# Patient Record
Sex: MALE | Race: WHITE | NOT HISPANIC OR LATINO | URBAN - METROPOLITAN AREA
[De-identification: names, ages, dates, MRNs, and addresses within clinical notes are randomized per-mention and may not be internally consistent; named-entity substitution may affect disease eponyms.]

---

## 2020-01-08 PROBLEM — I63.9 CVA (CEREBRAL VASCULAR ACCIDENT): Status: ACTIVE | Noted: 2020-01-08

## 2020-01-08 PROBLEM — I10 HTN (HYPERTENSION): Status: ACTIVE | Noted: 2020-01-08

## 2020-01-08 PROBLEM — I25.10 CAD (CORONARY ARTERY DISEASE): Status: ACTIVE | Noted: 2020-01-08

## 2020-01-08 PROBLEM — E11.9 DIABETES: Status: ACTIVE | Noted: 2020-01-08

## 2020-01-12 PROBLEM — G93.6 CEREBRAL EDEMA: Status: ACTIVE | Noted: 2020-01-12

## 2020-01-12 PROBLEM — I50.9 CHF (CONGESTIVE HEART FAILURE): Status: ACTIVE | Noted: 2020-01-12

## 2020-01-12 PROBLEM — R47.01 APHASIA: Status: ACTIVE | Noted: 2020-01-12

## 2020-01-12 PROBLEM — I63.312 CEREBROVASCULAR ACCIDENT (CVA) DUE TO THROMBOSIS OF LEFT MIDDLE CEREBRAL ARTERY: Status: ACTIVE | Noted: 2020-01-08

## 2020-01-22 PROBLEM — I50.22 CHRONIC SYSTOLIC CONGESTIVE HEART FAILURE: Status: ACTIVE | Noted: 2020-01-12

## 2020-01-31 PROBLEM — F10.10 ALCOHOL ABUSE: Status: ACTIVE | Noted: 2020-01-31

## 2020-01-31 PROBLEM — F17.201 TOBACCO ABUSE, IN REMISSION: Status: ACTIVE | Noted: 2020-01-31

## 2020-01-31 PROBLEM — F14.11 COCAINE ABUSE IN REMISSION: Status: ACTIVE | Noted: 2020-01-31

## 2020-04-28 PROBLEM — R07.9 CHEST PAIN AT REST: Status: ACTIVE | Noted: 2020-04-28

## 2020-04-28 PROBLEM — R07.9 ACUTE CHEST PAIN: Status: ACTIVE | Noted: 2020-04-28

## 2020-04-29 PROBLEM — R07.9 ACUTE CHEST PAIN: Status: RESOLVED | Noted: 2020-04-28 | Resolved: 2020-04-29

## 2020-04-29 PROBLEM — E11.9 DIABETES: Status: RESOLVED | Noted: 2020-01-08 | Resolved: 2020-04-29

## 2020-04-29 PROBLEM — E87.1 HYPONATREMIA: Status: ACTIVE | Noted: 2020-04-29

## 2020-04-29 PROBLEM — R07.9 CHEST PAIN AT REST: Status: RESOLVED | Noted: 2020-04-28 | Resolved: 2020-04-29

## 2020-06-15 PROBLEM — Z79.899 HIGH RISK MEDICATION USE: Status: ACTIVE | Noted: 2020-06-15

## 2020-06-15 PROBLEM — F10.11 ALCOHOL ABUSE, IN REMISSION: Status: ACTIVE | Noted: 2020-01-31

## 2022-07-09 ENCOUNTER — TELEPHONE ENCOUNTER (OUTPATIENT)
Dept: URBAN - METROPOLITAN AREA CLINIC 121 | Facility: CLINIC | Age: 62
End: 2022-07-09

## 2022-07-10 ENCOUNTER — TELEPHONE ENCOUNTER (OUTPATIENT)
Dept: URBAN - METROPOLITAN AREA CLINIC 121 | Facility: CLINIC | Age: 62
End: 2022-07-10

## 2022-09-27 PROBLEM — E11.65 CONTROLLED TYPE 2 DIABETES MELLITUS WITH HYPERGLYCEMIA, WITH LONG-TERM CURRENT USE OF INSULIN: Status: ACTIVE | Noted: 2022-09-27

## 2022-09-27 PROBLEM — G81.94 HEMIPARESIS, LEFT: Status: ACTIVE | Noted: 2022-09-27

## 2022-09-27 PROBLEM — R56.9 SEIZURES: Status: ACTIVE | Noted: 2022-09-27

## 2022-09-27 PROBLEM — I63.511 ACUTE RIGHT ARTERIAL ISCHEMIC STROKE, MIDDLE CEREBRAL ARTERY (MCA): Status: ACTIVE | Noted: 2022-09-27

## 2022-09-27 PROBLEM — R47.01 APHASIA: Status: ACTIVE | Noted: 2022-09-27

## 2022-09-27 PROBLEM — Z79.4 CONTROLLED TYPE 2 DIABETES MELLITUS WITH HYPERGLYCEMIA, WITH LONG-TERM CURRENT USE OF INSULIN: Status: ACTIVE | Noted: 2022-09-27

## 2022-09-28 PROBLEM — E11.65 CONTROLLED TYPE 2 DIABETES MELLITUS WITH HYPERGLYCEMIA, WITH LONG-TERM CURRENT USE OF INSULIN: Status: RESOLVED | Noted: 2022-09-27 | Resolved: 2022-09-28

## 2022-09-28 PROBLEM — D64.9 ANEMIA: Status: ACTIVE | Noted: 2022-09-28

## 2022-09-28 PROBLEM — I10 ESSENTIAL HYPERTENSION: Status: ACTIVE | Noted: 2022-09-28

## 2022-09-28 PROBLEM — E11.9 CONTROLLED TYPE 2 DIABETES MELLITUS, WITHOUT LONG-TERM CURRENT USE OF INSULIN: Status: ACTIVE | Noted: 2022-09-28

## 2022-09-28 PROBLEM — Z79.4 CONTROLLED TYPE 2 DIABETES MELLITUS WITH HYPERGLYCEMIA, WITH LONG-TERM CURRENT USE OF INSULIN: Status: RESOLVED | Noted: 2022-09-27 | Resolved: 2022-09-28

## 2022-09-28 PROBLEM — I50.9 CHF (CONGESTIVE HEART FAILURE): Status: ACTIVE | Noted: 2022-09-28

## 2022-09-28 PROBLEM — I50.22 CHRONIC SYSTOLIC HEART FAILURE: Status: ACTIVE | Noted: 2022-09-28

## 2022-09-29 PROBLEM — I50.9 CHF (CONGESTIVE HEART FAILURE): Status: RESOLVED | Noted: 2022-09-28 | Resolved: 2022-09-29

## 2022-10-01 PROBLEM — Z43.1 ENCOUNTER FOR PEG (PERCUTANEOUS ENDOSCOPIC GASTROSTOMY): Status: ACTIVE | Noted: 2022-10-01

## 2022-10-02 PROBLEM — Z79.4 TYPE 2 DIABETES MELLITUS WITH HYPERGLYCEMIA, WITH LONG-TERM CURRENT USE OF INSULIN: Status: ACTIVE | Noted: 2022-09-28

## 2022-10-02 PROBLEM — E11.65 TYPE 2 DIABETES MELLITUS WITH HYPERGLYCEMIA, WITH LONG-TERM CURRENT USE OF INSULIN: Status: ACTIVE | Noted: 2022-09-28

## 2022-10-03 PROBLEM — G40.909 EPILEPSY: Status: ACTIVE | Noted: 2022-09-27

## 2022-10-03 PROBLEM — I25.10 CORONARY ARTERY DISEASE INVOLVING NATIVE CORONARY ARTERY OF NATIVE HEART WITHOUT ANGINA PECTORIS: Status: ACTIVE | Noted: 2022-10-03

## 2022-10-04 PROBLEM — D72.829 LEUKOCYTOSIS: Status: ACTIVE | Noted: 2022-10-04

## 2022-10-04 PROBLEM — R50.9 FEVER: Status: ACTIVE | Noted: 2022-10-04

## 2022-10-04 PROBLEM — N17.9 AKI (ACUTE KIDNEY INJURY): Status: ACTIVE | Noted: 2022-10-04

## 2022-10-05 PROBLEM — R00.0 TACHYCARDIA: Status: ACTIVE | Noted: 2022-10-05

## 2022-10-05 PROBLEM — J69.0 ASPIRATION PNEUMONIA: Status: ACTIVE | Noted: 2022-10-05

## 2022-10-06 PROBLEM — D72.829 LEUKOCYTOSIS: Status: RESOLVED | Noted: 2022-10-04 | Resolved: 2022-10-06

## 2022-10-06 PROBLEM — E87.6 HYPOKALEMIA: Status: RESOLVED | Noted: 2022-10-06 | Resolved: 2022-10-06

## 2022-10-06 PROBLEM — R50.9 FEVER: Status: RESOLVED | Noted: 2022-10-04 | Resolved: 2022-10-06

## 2022-10-06 PROBLEM — R00.0 TACHYCARDIA: Status: RESOLVED | Noted: 2022-10-05 | Resolved: 2022-10-06

## 2022-10-06 PROBLEM — N17.9 AKI (ACUTE KIDNEY INJURY): Status: RESOLVED | Noted: 2022-10-04 | Resolved: 2022-10-06

## 2022-10-06 PROBLEM — E87.6 HYPOKALEMIA: Status: ACTIVE | Noted: 2022-10-06

## 2022-10-08 PROBLEM — Z93.1 S/P PERCUTANEOUS ENDOSCOPIC GASTROSTOMY (PEG) TUBE PLACEMENT: Status: ACTIVE | Noted: 2022-10-08

## 2022-10-08 PROBLEM — I50.23 ACUTE ON CHRONIC SYSTOLIC HEART FAILURE: Status: ACTIVE | Noted: 2022-09-28

## 2022-10-08 PROBLEM — I50.9 ACUTE DECOMPENSATED HEART FAILURE: Status: ACTIVE | Noted: 2022-10-08

## 2022-10-08 PROBLEM — I48.0 PAROXYSMAL ATRIAL FIBRILLATION: Status: ACTIVE | Noted: 2022-10-08

## 2022-10-08 PROBLEM — J18.9 PNEUMONIA: Status: ACTIVE | Noted: 2022-10-05

## 2022-10-08 PROBLEM — G93.40 ENCEPHALOPATHY: Status: ACTIVE | Noted: 2022-10-08

## 2022-10-08 PROBLEM — R79.89 LFTS ABNORMAL: Status: ACTIVE | Noted: 2022-10-08

## 2022-10-09 PROBLEM — R41.82 ALTERED MENTAL STATUS: Status: RESOLVED | Noted: 2022-10-09 | Resolved: 2022-10-09

## 2022-10-09 PROBLEM — J96.01 ACUTE HYPOXEMIC RESPIRATORY FAILURE: Status: ACTIVE | Noted: 2022-10-09

## 2022-10-09 PROBLEM — U07.1 COVID-19: Status: ACTIVE | Noted: 2022-10-09

## 2022-10-09 PROBLEM — R41.82 ALTERED MENTAL STATUS: Status: ACTIVE | Noted: 2022-10-09

## 2022-10-11 PROBLEM — I50.9 ACUTE DECOMPENSATED HEART FAILURE: Status: RESOLVED | Noted: 2022-10-08 | Resolved: 2022-10-11

## 2022-10-11 PROBLEM — N17.9 AKI (ACUTE KIDNEY INJURY): Status: RESOLVED | Noted: 2022-10-04 | Resolved: 2022-10-11

## 2022-10-11 PROBLEM — R78.81 GRAM-POSITIVE BACTEREMIA: Status: ACTIVE | Noted: 2022-10-11

## 2022-10-14 DIAGNOSIS — U07.1 COVID-19 VIRUS DETECTED: ICD-10-CM

## 2022-10-14 PROBLEM — R78.81 GRAM-POSITIVE BACTEREMIA: Status: RESOLVED | Noted: 2022-10-11 | Resolved: 2022-10-14

## 2022-10-14 PROBLEM — I50.23 ACUTE ON CHRONIC SYSTOLIC HEART FAILURE: Status: RESOLVED | Noted: 2022-09-28 | Resolved: 2022-10-14

## 2022-11-19 PROBLEM — R56.9 SEIZURE AS LATE EFFECT OF CEREBROVASCULAR ACCIDENT (CVA): Status: ACTIVE | Noted: 2022-11-19

## 2022-11-19 PROBLEM — I69.398 SEIZURE AS LATE EFFECT OF CEREBROVASCULAR ACCIDENT (CVA): Status: ACTIVE | Noted: 2022-11-19

## 2023-01-09 PROBLEM — I63.511 ACUTE ISCHEMIC RIGHT MCA STROKE: Status: RESOLVED | Noted: 2022-09-27 | Resolved: 2023-01-09

## 2023-01-16 PROBLEM — J18.9 PNEUMONIA: Status: RESOLVED | Noted: 2022-10-05 | Resolved: 2023-01-16

## 2023-01-16 PROBLEM — J96.01 ACUTE HYPOXEMIC RESPIRATORY FAILURE: Status: RESOLVED | Noted: 2022-10-09 | Resolved: 2023-01-16

## 2023-03-17 ENCOUNTER — APPOINTMENT (RX ONLY)
Dept: URBAN - METROPOLITAN AREA CLINIC 147 | Facility: CLINIC | Age: 63
Setting detail: DERMATOLOGY
End: 2023-03-17

## 2023-03-17 DIAGNOSIS — L72.8 OTHER FOLLICULAR CYSTS OF THE SKIN AND SUBCUTANEOUS TISSUE: ICD-10-CM

## 2023-03-17 PROBLEM — D48.5 NEOPLASM OF UNCERTAIN BEHAVIOR OF SKIN: Status: ACTIVE | Noted: 2023-03-17

## 2023-03-17 PROCEDURE — 99202 OFFICE O/P NEW SF 15 MIN: CPT

## 2023-03-17 PROCEDURE — ? DEFER

## 2023-03-17 PROCEDURE — ? FULL BODY SKIN EXAM - DECLINED

## 2023-03-17 PROCEDURE — ? COUNSELING

## 2023-03-17 ASSESSMENT — LOCATION SIMPLE DESCRIPTION DERM: LOCATION SIMPLE: POSTERIOR NECK

## 2023-03-17 ASSESSMENT — LOCATION DETAILED DESCRIPTION DERM: LOCATION DETAILED: RIGHT MEDIAL TRAPEZIAL NECK

## 2023-03-17 ASSESSMENT — LOCATION ZONE DERM: LOCATION ZONE: NECK

## 2023-03-17 NOTE — PROCEDURE: DEFER
Size Of Lesion In Cm (Optional): 0
Detail Level: Detailed
Introduction Text (Please End With A Colon): The following procedure was deferred:
Procedure To Be Performed At Next Visit: Excision
Instructions (Optional): With Dr. Martin